# Patient Record
(demographics unavailable — no encounter records)

---

## 2025-01-16 NOTE — ASSESSMENT
[FreeTextEntry1] : 85 y/o male with PMHx significant for BPH, pre-diabetes, GERD, HTN, HLD, Neuropathy, Osteopenia, Insomnia, psoriasis, presenting to the office for medication refills and diabetes check.  MSK: LEFT hip pain -LEFT hip Xray showed moderate to severe OA -Continue Naproxen, e-refilled  ENT: Tinnitus, hearing loss ENT referral   RHEUM: h/o Osteopenia, Psoriasis. -Rheumatology following -Continue Calcium / vit D -Continue Methotrexate 2.5 mg 8 tabs weekly -Psoriatic plaques throughout have improved dramatically to almost complete remission  CVS: h/o HTN, HLD, heart murmur -Blood pressure optimal -Continue Enalapril 20 mg, Amlodipine 5 mg daily, HCTZ 25 mg daily, all refilled. -Cardiology Dr Emery  ENDOCRINE: h/o diabetes -A1c 5.8--> 6.0 --> 6.0 --> 6.2--> 6.6 --> 7.7--> 6.5 --> 6.4 POCT today -Continue Metformin 1000 mg bid, Glipizide 5 mg, Januvia 50 mg, e-refilled. -Ophthalmology visit 7/21, no diabetic retinopathy, with Dr Wilcox. -Podiatry referral with Dr Rondon, never followed up  GI: h/o GERD -Continue PPI - Pantoprazole, e-refilled.  Neuro: Peripheral neuropathy, senile dementia -Continue Gabapentin 400 mg 3 x a day. -Continue encouraging memory games, reading, frequent re-orientation and never let him go outside by himself -SLUMS score for dementia, likely lack of formal education  : h/o BPH, urinary frequency and incontinence -Continue Finasteride daily and Tamsulosin increased to bid, both e-refilled -Frequency and incontinence improved.  HCM: -Blood work in house -Flu vaccine declined permanently -HIV / Hep C non-reactive -Reports never having Colonoscopy done -Prevnar 13 11/22/16 -Pneumovax 6/27/18 -Covid vaccination completed 6/21/21, 7/12/21, declines Covid booster

## 2025-01-16 NOTE — HISTORY OF PRESENT ILLNESS
[Family Member] : family member [FreeTextEntry1] : follow up. [de-identified] : This is an 89 y/o male with PMHx significant for BPH, diabetes, GERD, HTN, HLD, senile dementia, Neuropathy, Osteopenia, Insomnia, psoriasis, presenting to the office for diabetes check and medication refills.

## 2025-01-16 NOTE — HEALTH RISK ASSESSMENT
[Intercurrent hospitalizations] : was admitted to the hospital  [No] : In the past 12 months have you used drugs other than those required for medical reasons? No [No falls in past year] : Patient reported no falls in the past year [0] : 2) Feeling down, depressed, or hopeless: Not at all (0) [PHQ-2 Negative - No further assessment needed] : PHQ-2 Negative - No further assessment needed [With Patient/Caregiver] : , with patient/caregiver [Reviewed no changes] : Reviewed, no changes [Never] : Never [Audit-CScore] : 0 [de-identified] : none [de-identified] : regular [GPR2Fiezy] : 0 [AdvancecareDate] : 06/22

## 2025-01-16 NOTE — REVIEW OF SYSTEMS
[Joint Pain] : joint pain [Joint Stiffness] : joint stiffness [Itching] : itching [Skin Rash] : skin rash [Confusion] : confusion [Memory Loss] : memory loss [Negative] : Psychiatric [FreeTextEntry9] : Hip pain

## 2025-01-16 NOTE — PHYSICAL EXAM
[Normal] : no carotid or abdominal bruits heard, no varicosities, pedal pulses are present, no peripheral edema, no extremity clubbing or cyanosis and no palpable aorta [___/1] : [unfilled]/1   [___/3] : [unfilled]/3 [___/5] : [unfilled]/5 [___/4] : [unfilled]/4 [___/2] : [unfilled]/2 [___/8] : [unfilled]/8 [Dementia] : Dementia [de-identified] : Decreased ROM of lower extremities secondary to hip pain [de-identified] : Vastly improved psoriasis throughout his body [TextBox_2] : yes [TextBox_4] : less than elementary school [SlumsTotal] : 8

## 2025-02-04 NOTE — ASSESSMENT
[FreeTextEntry1] : Psoriasis- elevated PASI PsA Polyarthralgia + Hep B Core AB. PCR not detected   Symptoms concerning for PsA  - repeat labs - c/w MTX- 20mg weekly, folic acid 1 mg daily - topical clobetasol - c/w Humira 40mg SC every 2 weeks - reviewed risk and benefits of medications - dermatology f/u  Discussed treatment plan with the patient and his son. The patient was given the opportunity to ask questions and all questions were answered to their satisfaction.

## 2025-02-04 NOTE — HISTORY OF PRESENT ILLNESS
[FreeTextEntry1] : Pt presenting today for a f.u visit for psoriasis, PsA.  No acute complaints today.  Last seen 9/2024. Chart reviewed since LV.  On Humira 40mg SC every 2 weeks, MTX weekly. Tolerating medication well. Denies side effects. Reports significant improvement in symptoms with the addition of Humira.  ROS otherwise unchanged from LV.

## 2025-02-04 NOTE — PHYSICAL EXAM
[General Appearance - Alert] : alert [General Appearance - In No Acute Distress] : in no acute distress [General Appearance - Well Nourished] : well nourished [General Appearance - Well Developed] : well developed [Sclera] : the sclera and conjunctiva were normal [Outer Ear] : the ears and nose were normal in appearance [Neck Appearance] : the appearance of the neck was normal [No Focal Deficits] : no focal deficits [Oriented To Time, Place, And Person] : oriented to person, place, and time [Musculoskeletal - Swelling] : no joint swelling seen [FreeTextEntry1] : ++diffuse psorasis

## 2025-04-24 NOTE — ASSESSMENT
[Vaccines Reviewed] : Immunizations reviewed today. Please see immunization details in the vaccine log within the immunization flowsheet.  [FreeTextEntry1] : 88 y/o male with PMHx significant for BPH, pre-diabetes, GERD, HTN, HLD, Neuropathy, Osteopenia, Insomnia, psoriasis, recent hospitalization for CVS vs Bell's palsy, presenting to the office for CPE  NEURO: CVA vs Bell's Palsy -Will request [facility] records from hospitalization -Follow up with Neurosurgery as recommended on discharge  MSK: LEFT hip pain -LEFT hip Xray showed moderate to severe OA -Continue Naproxen, e-refilled  ENT: Tinnitus, hearing loss -ENT referral provided last visit, did not make appointment  RHEUM: h/o Osteopenia, Psoriasis. -Rheumatology following, Dr Bell -Continue Calcium / vit D -Continue Methotrexate 2.5 mg 8 tabs weekly -Psoriatic plaques throughout have improved dramatically to almost complete remission -Concern for PsA, follow up Rheum work up  CVS: h/o HTN, HLD, heart murmur -Blood pressure optimal -Continue Enalapril 20 mg, Amlodipine 5 mg daily, HCTZ 25 mg daily, all refilled. -Cardiology Dr Emery  ENDOCRINE: h/o diabetes -A1c 5.8--> 6.0 --> 6.0 --> 6.2--> 6.6 --> 7.7--> 6.5 --> 6.4--> 7.2 today -Continue Metformin 1000 mg bid, Glipizide 5 mg, Januvia 50 mg, e-refilled. -Ophthalmology visit 7/21, no diabetic retinopathy, with Dr Wilcox. -Podiatry referral with Dr Rondon, never followed up  GI: h/o GERD -Continue PPI - Pantoprazole.  Neuro: Peripheral neuropathy, senile dementia -Continue Gabapentin 400 mg 3 x a day. -Continue encouraging memory games, reading, frequent re-orientation and never let him go outside by himself -SLUMS score for dementia, likely lack of formal education  : h/o BPH, urinary frequency and incontinence -Continue Finasteride daily and Tamsulosin increased to bid, both e-refilled -Frequency and incontinence improved.  HCM: -Blood work in house -Flu vaccine declined permanently -HIV / Hep C non-reactive -Reports never having Colonoscopy done -Prevnar 13 11/22/16 -Pneumovax 6/27/18 -Covid vaccination completed 6/21/21, 7/12/21, declines Covid booster

## 2025-04-24 NOTE — COUNSELING
[Fall prevention counseling provided] : Fall prevention counseling provided [Adequate lighting] : Adequate lighting [No throw rugs] : No throw rugs [Use proper foot wear] : Use proper foot wear [Behavioral health counseling provided] : Behavioral health counseling provided [Sleep ___ hours/day] : Sleep [unfilled] hours/day [AUDIT-C Screening administered and reviewed] : AUDIT-C Screening administered and reviewed [Encouraged to increase physical activity] : Encouraged to increase physical activity [____ min/wk Activity] : [unfilled] min/wk activity [None] : None [Good understanding] : Patient has a good understanding of lifestyle changes and steps needed to achieve self management goal

## 2025-04-24 NOTE — PHYSICAL EXAM
[No Joint Swelling] : no joint swelling [Grossly Normal Strength/Tone] : grossly normal strength/tone [Coordination Grossly Intact] : coordination grossly intact [Normal Gait] : normal gait [Deep Tendon Reflexes (DTR)] : deep tendon reflexes were 2+ and symmetric [Normal] : affect was normal and insight and judgment were intact [de-identified] : Ptosis of RIGHT upper eyelid [de-identified] : flatening of nasolabial fold on the RIGHT side. [de-identified] : RIGHT facial droop

## 2025-04-24 NOTE — HISTORY OF PRESENT ILLNESS
[FreeTextEntry1] : Physical exam [de-identified] : This is an 88 y/o male with PMHx significant for BPH, diabetes, GERD, HTN, HLD, senile dementia, Neuropathy, Osteopenia, Insomnia, psoriasis, presenting to the office for CPE. Pt was hospitalized on 3/9/25 at [facility] for what it seems CVA with RIGHT facial hemiparesis, family unsure of diagnosis and we have no records of hospitalization. Pt's complains are setting on inability to eat properly as food comes out of his mouth and his eye has a lagging upper eyelid that bothers his vision.

## 2025-04-24 NOTE — REVIEW OF SYSTEMS
[Negative] : Heme/Lymph [FreeTextEntry3] : RIGHT vision affected with low eyelid [FreeTextEntry4] : weakness of lips on the right side

## 2025-04-24 NOTE — HEALTH RISK ASSESSMENT
[Good] : ~his/her~  mood as  good [Intercurrent hospitalizations] : was admitted to the hospital  [No] : In the past 12 months have you used drugs other than those required for medical reasons? No [No falls in past year] : Patient reported no falls in the past year [0] : 2) Feeling down, depressed, or hopeless: Not at all (0) [PHQ-2 Negative - No further assessment needed] : PHQ-2 Negative - No further assessment needed [Never] : Never [With Family] : lives with family [# of Members in Household ___] :  household currently consist of [unfilled] member(s) [Retired] : retired [] :  [# Of Children ___] : has [unfilled] children [Feels Safe at Home] : Feels safe at home [Fully functional (bathing, dressing, toileting, transferring, walking, feeding)] : Fully functional (bathing, dressing, toileting, transferring, walking, feeding) [Fully functional (using the telephone, shopping, preparing meals, housekeeping, doing laundry, using] : Fully functional and needs no help or supervision to perform IADLs (using the telephone, shopping, preparing meals, housekeeping, doing laundry, using transportation, managing medications and managing finances) [Reports changes in hearing] : Reports changes in hearing [Reports changes in vision] : Reports changes in vision [Smoke Detector] : smoke detector [Carbon Monoxide Detector] : carbon monoxide detector [Seat Belt] :  uses seat belt [With Patient/Caregiver] : , with patient/caregiver [Reviewed no changes] : Reviewed, no changes [1 or 2 (0 pts)] : 1 or 2 (0 points) [Never (0 pts)] : Never (0 points) [Little interest or pleasure doing things] : 1) Little interest or pleasure doing things [Feeling down, depressed, or hopeless] : 2) Feeling down, depressed, or hopeless [NO] : No [Designated Healthcare Proxy] : Designated healthcare proxy [Name: ___] : Health Care Proxy's Name: [unfilled]  [Relationship: ___] : Relationship: [unfilled] [Aggressive treatment] : aggressive treatment [Audit-CScore] : 0 [de-identified] : regular [de-identified] : none [[id number]] : 0 [Change in mental status noted] : No change in mental status noted [Language] : denies difficulty with language [Behavior] : denies difficulty with behavior [Learning/Retaining New Information] : denies difficulty learning/retaining new information [Handling Complex Tasks] : denies difficulty handling complex tasks [Reasoning] : denies difficulty with reasoning [Spatial Ability and Orientation] : denies difficulty with spatial ability and orientation [Sexually Active] : not sexually active [High Risk Behavior] : no high risk behavior [Reports changes in dental health] : Reports no changes in dental health [Sunscreen] : does not use sunscreen [BoneDensityDate] : 09/17 [BoneDensityComments] : Osteopenia [HIVDate] : 12/17 [HIVComments] : Non reactive [HepatitisCComments] : non reactive [HepatitisCDate] : 12/17 [AdvancecareDate] : 04/25